# Patient Record
Sex: MALE | URBAN - METROPOLITAN AREA
[De-identification: names, ages, dates, MRNs, and addresses within clinical notes are randomized per-mention and may not be internally consistent; named-entity substitution may affect disease eponyms.]

---

## 2023-11-10 ENCOUNTER — CLAIMS CREATED FROM THE CLAIM WINDOW (OUTPATIENT)
Dept: URBAN - METROPOLITAN AREA CLINIC 98 | Facility: CLINIC | Age: 43
End: 2023-11-10
Payer: SELF-PAY

## 2023-11-10 ENCOUNTER — TELEPHONE ENCOUNTER (OUTPATIENT)
Dept: URBAN - METROPOLITAN AREA CLINIC 97 | Facility: CLINIC | Age: 43
End: 2023-11-10

## 2023-11-10 ENCOUNTER — DASHBOARD ENCOUNTERS (OUTPATIENT)
Age: 43
End: 2023-11-10

## 2023-11-10 VITALS
DIASTOLIC BLOOD PRESSURE: 80 MMHG | SYSTOLIC BLOOD PRESSURE: 124 MMHG | HEIGHT: 72 IN | WEIGHT: 187 LBS | HEART RATE: 68 BPM | TEMPERATURE: 97.3 F | BODY MASS INDEX: 25.33 KG/M2

## 2023-11-10 DIAGNOSIS — R21 SKIN RASH: ICD-10-CM

## 2023-11-10 DIAGNOSIS — Z12.11 COLON CANCER SCREENING: ICD-10-CM

## 2023-11-10 DIAGNOSIS — R10.84 GENERALIZED ABDOMINAL PAIN: ICD-10-CM

## 2023-11-10 PROCEDURE — 99204 OFFICE O/P NEW MOD 45 MIN: CPT

## 2023-11-10 PROCEDURE — 91065 BREATH HYDROGEN/METHANE TEST: CPT

## 2023-11-10 RX ORDER — PANTOPRAZOLE SODIUM 40 MG/1
1 TABLET TABLET, DELAYED RELEASE ORAL TWICE A DAY
Qty: 60 TABLET | Refills: 0 | OUTPATIENT
Start: 2023-11-10

## 2023-11-10 NOTE — HPI-TODAY'S VISIT:
Mr. Jha is a 43-year-old male new patient here to discuss sudden onset of abdominal pain. Does not have a PCP. - 11/6/23 Monday- ate a salad - 11/7 Tuesday morning had watery stool; 3 episodes with intermittent upper abdominal pain - Pain was sharp that could last for a couple of hours. - Pain relieved after eating - Developed a rash all over his chest and back on the same day the pain started; does not itch - 11/8 went to urgent care  - CT scan 11/8 with contrast- was normal per patient; report not available - Prescribed pantoprazole 20 mg daily and Carafate  - Unable to get Carafate; backorder - Taking pantoprazole 20 mg, with tums and Tylenol - Patient gets temporary relief with a combination of these medications - Denies nausea, vomiting, heartburn, dysphagia, diarrhea, fever, chills - BM usually twice per day; has not gone in the last 2 days; possibly correlated to eating less - Reports personal life and work very stressful lately ( and going through a divorce).

## 2023-11-13 LAB — H PYLORI BREATH TEST: NOT DETECTED

## 2024-11-19 ENCOUNTER — OFFICE VISIT (OUTPATIENT)
Dept: URBAN - METROPOLITAN AREA TELEHEALTH 2 | Facility: TELEHEALTH | Age: 44
End: 2024-11-19

## 2024-11-19 RX ORDER — WHEAT DEXTRIN 3 G/3.5 G
AS DIRECTED POWDER (GRAM) ORAL
OUTPATIENT

## 2024-11-19 RX ORDER — PANTOPRAZOLE SODIUM 40 MG/1
1 TABLET TABLET, DELAYED RELEASE ORAL TWICE A DAY
Qty: 60 TABLET | Refills: 0 | Status: ACTIVE | COMMUNITY
Start: 2023-11-10

## 2024-11-19 NOTE — HPI-TODAY'S VISIT:
f/u visit Doing well from Abd pain standpoint In Sept this year he has some BRBPR Due for colonoscopy Never had one before  . PRIOR VISITI: Mr. Jha is a 43-year-old male new patient here to discuss sudden onset of abdominal pain. Does not have a PCP. - 11/6/23 Monday- ate a salad - 11/7 Tuesday morning had watery stool; 3 episodes with intermittent upper abdominal pain - Pain was sharp that could last for a couple of hours. - Pain relieved after eating - Developed a rash all over his chest and back on the same day the pain started; does not itch - 11/8 went to urgent care  - CT scan 11/8 with contrast- was normal per patient; report not available - Prescribed pantoprazole 20 mg daily and Carafate  - Unable to get Carafate; backorder - Taking pantoprazole 20 mg, with tums and Tylenol - Patient gets temporary relief with a combination of these medications - Denies nausea, vomiting, heartburn, dysphagia, diarrhea, fever, chills - BM usually twice per day; has not gone in the last 2 days; possibly correlated to eating less - Reports personal life and work very stressful lately ( and going through a divorce).

## 2025-01-22 ENCOUNTER — TELEPHONE ENCOUNTER (OUTPATIENT)
Dept: URBAN - METROPOLITAN AREA CLINIC 98 | Facility: CLINIC | Age: 45
End: 2025-01-22

## 2025-01-30 ENCOUNTER — OFFICE VISIT (OUTPATIENT)
Dept: URBAN - METROPOLITAN AREA SURGERY CENTER 18 | Facility: SURGERY CENTER | Age: 45
End: 2025-01-30